# Patient Record
Sex: FEMALE | Race: WHITE | NOT HISPANIC OR LATINO | Employment: FULL TIME | URBAN - NONMETROPOLITAN AREA
[De-identification: names, ages, dates, MRNs, and addresses within clinical notes are randomized per-mention and may not be internally consistent; named-entity substitution may affect disease eponyms.]

---

## 2018-08-08 ENCOUNTER — OFFICE VISIT (OUTPATIENT)
Dept: MEDICAL GROUP | Facility: CLINIC | Age: 33
End: 2018-08-08
Payer: COMMERCIAL

## 2018-08-08 VITALS
TEMPERATURE: 99.6 F | HEIGHT: 63 IN | WEIGHT: 130.7 LBS | HEART RATE: 90 BPM | SYSTOLIC BLOOD PRESSURE: 106 MMHG | BODY MASS INDEX: 23.16 KG/M2 | OXYGEN SATURATION: 99 % | DIASTOLIC BLOOD PRESSURE: 73 MMHG

## 2018-08-08 DIAGNOSIS — Z01.419 WELL WOMAN EXAM: ICD-10-CM

## 2018-08-08 PROCEDURE — 99395 PREV VISIT EST AGE 18-39: CPT | Performed by: NURSE PRACTITIONER

## 2018-08-08 NOTE — PROGRESS NOTES
"S:  Lynnette Mac is a 32 y.o.,femalewho presents today for her Pap and GYN exam.  Her LMP was last month.  She is still having regular menses.  Her form of contraception is  vasectomy    No problem-specific Assessment & Plan notes found for this encounter.      She is , P:2.    She has not had an Abnormal Pap previously.  Her last Mammogram was done: due at 39 yo .     Her preventative health screens are up to date.  GYN ROS:  normal menses, no abnormal bleeding, pelvic pain or discharge, no breast pain or new or enlarging lumps on self exam    Patient Active Problem List    Diagnosis Date Noted   • Well woman exam 2018     No current outpatient prescriptions on file prior to visit.     No current facility-administered medications on file prior to visit.      Social History   Substance Use Topics   • Smoking status: Never Smoker   • Smokeless tobacco: Never Used   • Alcohol use 1.2 oz/week     2 Glasses of wine per week       O: /73   Pulse 90   Temp 37.6 °C (99.6 °F)   Ht 1.6 m (5' 3\")   Wt 59.3 kg (130 lb 11.2 oz)   SpO2 99%   BMI 23.15 kg/m²   Vitals Noted and Reviewed  Breast: breasts symmetric, no dominant or suspicious mass, no skin or nipple changes and no axillary adenopathy  Lungs: normal to percussion and auscultation  Heart: normal  Vulva: grossly unremarkable  Vagina: no abnormal discharge  Cervix: Parous, nonfriable, no surface lesions identified, Pap was performed  Uterus: Retroverted, mobile  Bimanual exam: No uteromegaly, negative chandelier sign, adnexa freely movable and without enlargements bilaterally  Rectal: not performed    Assessment:  NormalGYN Exam      Plan:   pap smear  return annually or prn    Pap processed and sent to the lab.    Recommend follow up in one year or prn.     "

## 2018-08-12 LAB
C TRACH RRNA CVX QL NAA+PROBE: NEGATIVE
CYTOLOGIST CVX/VAG CYTO: NORMAL
CYTOLOGY CVX/VAG DOC THIN PREP: NORMAL
DX ICD CODE: NORMAL
HPV I/H RISK 1 DNA CVX QL PROBE+SIG AMP: NEGATIVE
N GONORRHOEA RRNA CVX QL NAA+PROBE: NEGATIVE
NOTE  190109: NORMAL
OTHER STN SPEC: NORMAL
PATH REPORT.FINAL DX SPEC: NORMAL
STAT OF ADQ CVX/VAG CYTO-IMP: NORMAL

## 2018-08-13 ENCOUNTER — TELEPHONE (OUTPATIENT)
Dept: MEDICAL GROUP | Facility: CLINIC | Age: 33
End: 2018-08-13

## 2018-08-13 NOTE — LETTER
August 13, 2018    Lynnette Mac  [id number]  [place] [address]      Dear Lynnette:    VERY GOOD NEWS!    The results of your most recent Pap smear are normal. This means that no cancerous or precancerous cells were seen. We recommend that you come back in 3 years for your next routine Pap smear.    If you have any questions or concerns, please don't hesitate to call.      Sincerely,        NARCISA Johnson.    Electronically Signed